# Patient Record
Sex: FEMALE | URBAN - METROPOLITAN AREA
[De-identification: names, ages, dates, MRNs, and addresses within clinical notes are randomized per-mention and may not be internally consistent; named-entity substitution may affect disease eponyms.]

---

## 2024-01-17 ENCOUNTER — OFFICE (OUTPATIENT)
Dept: URBAN - METROPOLITAN AREA CLINIC 81 | Facility: CLINIC | Age: 39
End: 2024-01-17

## 2024-01-17 VITALS — HEIGHT: 62 IN | WEIGHT: 145 LBS

## 2024-01-17 DIAGNOSIS — E10.9 TYPE 1 DIABETES MELLITUS WITHOUT COMPLICATIONS: ICD-10-CM

## 2024-01-17 DIAGNOSIS — R11.2 NAUSEA WITH VOMITING, UNSPECIFIED: ICD-10-CM

## 2024-01-17 PROCEDURE — 99204 OFFICE O/P NEW MOD 45 MIN: CPT | Mod: 95 | Performed by: NURSE PRACTITIONER

## 2024-01-17 NOTE — SERVICENOTES
Telehealth Platform Used: [employer]ximity
Location of patient: home
Location of provider: home in room alone with door shut
Other persons participating: none

## 2024-01-17 NOTE — SERVICEHPINOTES
Ms. Nuno agreed to telehealth audio/visual consult on chronic nausea and vomiting for the last four months. It is intermittent and only when she is stressed/anxious or unable to get proper sleep. Episodes will range from 2-3 emesis to several hours. It may contain recent meal or just be bile. Never vomits what she ate the night before.
br She states she can eat fine. Denies early satiety, bloating, pain, dysphagia, heartburn, indigestion, weight loss, diarrhea, constipation, bloody stools, mucus in stools, melena.
br Denies marijuana use.